# Patient Record
Sex: FEMALE | Race: WHITE | Employment: FULL TIME | URBAN - METROPOLITAN AREA
[De-identification: names, ages, dates, MRNs, and addresses within clinical notes are randomized per-mention and may not be internally consistent; named-entity substitution may affect disease eponyms.]

---

## 2024-06-29 ENCOUNTER — HOSPITAL ENCOUNTER (INPATIENT)
Facility: HOSPITAL | Age: 29
LOS: 1 days | Discharge: HOME | End: 2024-06-30
Attending: EMERGENCY MEDICINE | Admitting: HOSPITALIST
Payer: COMMERCIAL

## 2024-06-29 ENCOUNTER — APPOINTMENT (OUTPATIENT)
Dept: MRI IMAGING | Facility: HOSPITAL | Age: 29
End: 2024-06-29
Attending: EMERGENCY MEDICINE
Payer: COMMERCIAL

## 2024-06-29 ENCOUNTER — APPOINTMENT (OUTPATIENT)
Dept: CT IMAGING | Facility: HOSPITAL | Age: 29
End: 2024-06-29
Attending: EMERGENCY MEDICINE
Payer: COMMERCIAL

## 2024-06-29 DIAGNOSIS — R29.810 FACIAL WEAKNESS: ICD-10-CM

## 2024-06-29 DIAGNOSIS — R20.0 LEFT SIDED NUMBNESS: Primary | ICD-10-CM

## 2024-06-29 PROBLEM — S16.1XXA CERVICAL STRAIN: Status: ACTIVE | Noted: 2024-06-29

## 2024-06-29 LAB
ALBUMIN SERPL-MCNC: 3.8 G/DL (ref 3.4–5)
ALBUMIN/GLOB SERPL: 1 {RATIO} (ref 1–2)
ALP LIVER SERPL-CCNC: 73 U/L
ALT SERPL-CCNC: 28 U/L
ANION GAP SERPL CALC-SCNC: 7 MMOL/L (ref 0–18)
APTT PPP: 23.8 SECONDS (ref 23–36)
AST SERPL-CCNC: 13 U/L (ref 15–37)
B-HCG SERPL-ACNC: <1 MIU/ML
B-HCG UR QL: NEGATIVE
BASOPHILS # BLD AUTO: 0.02 X10(3) UL (ref 0–0.2)
BASOPHILS NFR BLD AUTO: 0.3 %
BILIRUB SERPL-MCNC: 0.6 MG/DL (ref 0.1–2)
BUN BLD-MCNC: 9 MG/DL (ref 9–23)
CALCIUM BLD-MCNC: 8.9 MG/DL (ref 8.5–10.1)
CHLORIDE SERPL-SCNC: 109 MMOL/L (ref 98–112)
CO2 SERPL-SCNC: 23 MMOL/L (ref 21–32)
CREAT BLD-MCNC: 1.05 MG/DL
EGFRCR SERPLBLD CKD-EPI 2021: 73 ML/MIN/1.73M2 (ref 60–?)
EOSINOPHIL # BLD AUTO: 0.16 X10(3) UL (ref 0–0.7)
EOSINOPHIL NFR BLD AUTO: 2.8 %
ERYTHROCYTE [DISTWIDTH] IN BLOOD BY AUTOMATED COUNT: 11.6 %
ETHANOL SERPL-MCNC: 105 MG/DL (ref ?–3)
GLOBULIN PLAS-MCNC: 4 G/DL (ref 2.8–4.4)
GLUCOSE BLD-MCNC: 85 MG/DL (ref 70–99)
GLUCOSE BLD-MCNC: 88 MG/DL (ref 70–99)
HCT VFR BLD AUTO: 40.1 %
HGB BLD-MCNC: 14.4 G/DL
IMM GRANULOCYTES # BLD AUTO: 0.01 X10(3) UL (ref 0–1)
IMM GRANULOCYTES NFR BLD: 0.2 %
INR BLD: 0.98 (ref 0.8–1.2)
LYMPHOCYTES # BLD AUTO: 2.26 X10(3) UL (ref 1–4)
LYMPHOCYTES NFR BLD AUTO: 39 %
MCH RBC QN AUTO: 31.4 PG (ref 26–34)
MCHC RBC AUTO-ENTMCNC: 35.9 G/DL (ref 31–37)
MCV RBC AUTO: 87.4 FL
MONOCYTES # BLD AUTO: 0.42 X10(3) UL (ref 0.1–1)
MONOCYTES NFR BLD AUTO: 7.3 %
NEUTROPHILS # BLD AUTO: 2.92 X10 (3) UL (ref 1.5–7.7)
NEUTROPHILS # BLD AUTO: 2.92 X10(3) UL (ref 1.5–7.7)
NEUTROPHILS NFR BLD AUTO: 50.4 %
OSMOLALITY SERPL CALC.SUM OF ELEC: 286 MOSM/KG (ref 275–295)
PLATELET # BLD AUTO: 216 10(3)UL (ref 150–450)
POTASSIUM SERPL-SCNC: 3.2 MMOL/L (ref 3.5–5.1)
PROT SERPL-MCNC: 7.8 G/DL (ref 6.4–8.2)
PROTHROMBIN TIME: 12.9 SECONDS (ref 11.6–14.8)
RBC # BLD AUTO: 4.59 X10(6)UL
SODIUM SERPL-SCNC: 139 MMOL/L (ref 136–145)
TROPONIN I SERPL HS-MCNC: 4 NG/L
WBC # BLD AUTO: 5.8 X10(3) UL (ref 4–11)

## 2024-06-29 PROCEDURE — 70496 CT ANGIOGRAPHY HEAD: CPT | Performed by: EMERGENCY MEDICINE

## 2024-06-29 PROCEDURE — 99223 1ST HOSP IP/OBS HIGH 75: CPT | Performed by: HOSPITALIST

## 2024-06-29 PROCEDURE — 70551 MRI BRAIN STEM W/O DYE: CPT | Performed by: EMERGENCY MEDICINE

## 2024-06-29 PROCEDURE — 70544 MR ANGIOGRAPHY HEAD W/O DYE: CPT | Performed by: EMERGENCY MEDICINE

## 2024-06-29 PROCEDURE — 70498 CT ANGIOGRAPHY NECK: CPT | Performed by: EMERGENCY MEDICINE

## 2024-06-29 PROCEDURE — 70450 CT HEAD/BRAIN W/O DYE: CPT | Performed by: EMERGENCY MEDICINE

## 2024-06-29 RX ORDER — ETONOGESTREL AND ETHINYL ESTRADIOL VAGINAL RING .015; .12 MG/D; MG/D
1 RING VAGINAL
COMMUNITY

## 2024-06-29 RX ORDER — POTASSIUM CHLORIDE 20 MEQ/1
TABLET, EXTENDED RELEASE ORAL
Status: COMPLETED
Start: 2024-06-29 | End: 2024-06-29

## 2024-06-29 RX ORDER — MELATONIN
3 NIGHTLY PRN
Status: DISCONTINUED | OUTPATIENT
Start: 2024-06-29 | End: 2024-06-30

## 2024-06-29 RX ORDER — CLOPIDOGREL BISULFATE 75 MG/1
75 TABLET ORAL ONCE
Status: COMPLETED | OUTPATIENT
Start: 2024-06-29 | End: 2024-06-29

## 2024-06-29 RX ORDER — HYDRALAZINE HYDROCHLORIDE 20 MG/ML
10 INJECTION INTRAMUSCULAR; INTRAVENOUS EVERY 2 HOUR PRN
Status: DISCONTINUED | OUTPATIENT
Start: 2024-06-29 | End: 2024-06-30

## 2024-06-29 RX ORDER — IBUPROFEN 200 MG
200 TABLET ORAL EVERY 4 HOURS PRN
Status: DISCONTINUED | OUTPATIENT
Start: 2024-06-29 | End: 2024-06-30

## 2024-06-29 RX ORDER — SODIUM CHLORIDE 9 MG/ML
INJECTION, SOLUTION INTRAVENOUS CONTINUOUS
Status: ACTIVE | OUTPATIENT
Start: 2024-06-29 | End: 2024-06-30

## 2024-06-29 RX ORDER — IBUPROFEN 600 MG/1
600 TABLET ORAL EVERY 4 HOURS PRN
Status: DISCONTINUED | OUTPATIENT
Start: 2024-06-29 | End: 2024-06-30

## 2024-06-29 RX ORDER — SODIUM CHLORIDE 9 MG/ML
1000 INJECTION, SOLUTION INTRAVENOUS ONCE
Status: COMPLETED | OUTPATIENT
Start: 2024-06-29 | End: 2024-06-29

## 2024-06-29 RX ORDER — LABETALOL HYDROCHLORIDE 5 MG/ML
10 INJECTION, SOLUTION INTRAVENOUS EVERY 10 MIN PRN
Status: DISCONTINUED | OUTPATIENT
Start: 2024-06-29 | End: 2024-06-30

## 2024-06-29 RX ORDER — ASPIRIN 81 MG/1
324 TABLET, CHEWABLE ORAL ONCE
Status: COMPLETED | OUTPATIENT
Start: 2024-06-29 | End: 2024-06-29

## 2024-06-29 RX ORDER — SODIUM CHLORIDE 9 MG/ML
INJECTION, SOLUTION INTRAVENOUS CONTINUOUS
Status: DISCONTINUED | OUTPATIENT
Start: 2024-06-29 | End: 2024-06-30

## 2024-06-29 RX ORDER — ACETAMINOPHEN 650 MG/1
650 SUPPOSITORY RECTAL EVERY 4 HOURS PRN
Status: DISCONTINUED | OUTPATIENT
Start: 2024-06-29 | End: 2024-06-30

## 2024-06-29 RX ORDER — ATORVASTATIN CALCIUM 40 MG/1
40 TABLET, FILM COATED ORAL NIGHTLY
Status: DISCONTINUED | OUTPATIENT
Start: 2024-06-29 | End: 2024-06-30

## 2024-06-29 RX ORDER — ACETAMINOPHEN 325 MG/1
650 TABLET ORAL EVERY 4 HOURS PRN
Status: DISCONTINUED | OUTPATIENT
Start: 2024-06-29 | End: 2024-06-30

## 2024-06-29 RX ORDER — POTASSIUM CHLORIDE 20 MEQ/1
40 TABLET, EXTENDED RELEASE ORAL ONCE
Status: COMPLETED | OUTPATIENT
Start: 2024-06-29 | End: 2024-06-29

## 2024-06-29 RX ORDER — IBUPROFEN 400 MG/1
400 TABLET ORAL EVERY 4 HOURS PRN
Status: DISCONTINUED | OUTPATIENT
Start: 2024-06-29 | End: 2024-06-30

## 2024-06-29 RX ORDER — BENZONATATE 100 MG/1
200 CAPSULE ORAL 3 TIMES DAILY PRN
Status: DISCONTINUED | OUTPATIENT
Start: 2024-06-29 | End: 2024-06-30

## 2024-06-29 RX ORDER — ACETAMINOPHEN 500 MG
1000 TABLET ORAL EVERY 6 HOURS PRN
Status: DISCONTINUED | OUTPATIENT
Start: 2024-06-29 | End: 2024-06-30

## 2024-06-29 RX ORDER — ASPIRIN 81 MG/1
81 TABLET, CHEWABLE ORAL DAILY
Status: DISCONTINUED | OUTPATIENT
Start: 2024-06-30 | End: 2024-06-30

## 2024-06-29 RX ORDER — ONDANSETRON 2 MG/ML
4 INJECTION INTRAMUSCULAR; INTRAVENOUS EVERY 6 HOURS PRN
Status: DISCONTINUED | OUTPATIENT
Start: 2024-06-29 | End: 2024-06-30

## 2024-06-29 NOTE — PROGRESS NOTES
ER DOC ZEITLER AT BEDSIDE    PT TO ER W/ COMPLAINTS OF LEFT SIDED FACIAL DROOP AND HEAVINESS TO LEFT ARM. SYMPTOMS BEGAN 1645 TODAY    AMBULATORY

## 2024-06-29 NOTE — ED PROVIDER NOTES
Patient Seen in: [facility] Emergency Department      History     Chief Complaint   Patient presents with    Numbness Weakness     Stated Complaint: NUMBNESS    Subjective:   HPI    Patient is a 29-year-old female presents emergency room with a history of being out today with friends at lunch did admit to drinking some alcohol earlier today and states that she coughed and had a sudden onset of some left-sided facial heaviness and also left arm and left leg heaviness.  This started within an hour of her presentation to the emergency room.  The patient denies history of any headache or neck pain.  The patient denies history of any fall or trauma.  The patient denies history of any vision change.  Patient states that she did see a chiropractor earlier this week and did have manipulation done of her neck.  Patient reportedly was also recently had a amusement park and rode a bunch of roller coasters.  The patient does admit to vaping but does not use recreational drugs.  The patient states she only had a small amount of alcohol today.  The patient denies history of any fall or trauma.  Patient's symptoms have mildly improved since they started as per patient and patient's friend at the bedside.    Objective:   Past Medical History:    Ocular migraine              History reviewed. No pertinent surgical history.             Social History     Socioeconomic History    Marital status:    Tobacco Use    Smoking status: Some Days     Types: Cigarettes    Smokeless tobacco: Never   Vaping Use    Vaping status: Every Day   Substance and Sexual Activity    Alcohol use: Yes     Comment: SOCIALLY    Drug use: Never              Review of Systems    Positive for stated Chief Complaint: Numbness Weakness    Other systems are as noted in HPI.  Constitutional and vital signs reviewed.      All other systems reviewed and negative except as noted above.    Physical Exam     ED Triage Vitals   BP 06/29/24 1733 (!) 139/102    Pulse 06/29/24 1733 70   Resp 06/29/24 1733 20   Temp 06/29/24 1757 97.6 °F (36.4 °C)   Temp src 06/29/24 1757 Temporal   SpO2 06/29/24 1733 98 %   O2 Device --        Current Vitals:   Vital Signs  BP: (!) 141/109  Pulse: 70  Resp: 22  Temp: 97.6 °F (36.4 °C)  Temp src: Temporal  MAP (mmHg): (!) 121    Oxygen Therapy  SpO2: 99 %            Physical Exam  GENERAL: Well-developed, well-nourished female sitting up breathing easily in no apparent distress.  Patient is nontoxic in appearance.  HEENT: Head is normocephalic, atraumatic. Pupils are 4 mm equally round and reactive to light. Oropharynx is clear. Mucous membranes are moist.  There is evidence of some mild left-sided facial drooping.  NECK: There is no focal tenderness to palpation appreciated.  LUNGS: Clear to auscultation bilaterally with no wheeze. There is good equal air entry bilaterally.  HEART: Regular rate and rhythm. Normal S1, S2 no S3, or S4. No murmur.  ABDOMEN: There is no focal tenderness to palpation appreciated anywhere throughout the abdomen. There is no guarding, no rebound, no mass, and no organomegaly appreciated. There is normoactive bowel sounds. There is no hernia.  EXTREMITIES: There is no cyanosis, clubbing, or edema appreciated. Pulses are 2+ and equal in all 4 extremities.  NEURO: Patient is awake, alert and oriented to time place and person. Motor strength is 5 over 5 in all 4 extremities. There are no gross motor or sensory deficits appreciated except as noted below. Cranial nerves II through XII are intact except for some mild left-sided facial numbness.  There is subjective numbness to the left upper and left lower extremity..             ED Course     Labs Reviewed   COMP METABOLIC PANEL (14) - Abnormal; Notable for the following components:       Result Value    Potassium 3.2 (*)     Creatinine 1.05 (*)     AST 13 (*)     All other components within normal limits   ETHYL ALCOHOL - Abnormal; Notable for the following  components:    Ethyl Alcohol 105 (*)     All other components within normal limits   TROPONIN I HIGH SENSITIVITY - Normal   PROTHROMBIN TIME (PT) - Normal   PTT, ACTIVATED - Normal   HCG, BETA SUBUNIT (QUANT PREGNANCY TEST) - Normal   POCT GLUCOSE - Normal   POCT PREGNANCY URINE - Normal   CBC WITH DIFFERENTIAL WITH PLATELET    Narrative:     The following orders were created for panel order CBC With Differential With Platelet.  Procedure                               Abnormality         Status                     ---------                               -----------         ------                     CBC W/ DIFFERENTIAL[[id number]]                              Final result                 Please view results for these tests on the individual orders.   HEMOGLOBIN A1C   RAINBOW DRAW LAVENDER   RAINBOW DRAW LIGHT GREEN   RAINBOW DRAW BLUE   RAINBOW DRAW GOLD   CBC W/ DIFFERENTIAL     EKG    Rate, intervals and axes as noted on EKG Report.  Rate: 88  Rhythm: Sinus Rhythm  Reading: No acute ischemic change noted           TNK/ NI Documentation:    Date/Time last known well:   1645.    NIHSS on presentation: 4     Chief Complaint   Patient presents with    Numbness Weakness     IV Tenecteplase (TNK) administered: No; Patient is not a Candidate for IV TNK due to: Presentation of symptoms and findings of possible AV fistula noted in the right side of the brain on CTA because of this patient was not a TNKase candidate as per discussion with Dr. Parr    Candidate for Endovascular thrombectomy (EVT): No; Patient is not a candidate for Endovascular Thrombectomy due to: No large vessel occlusion ( LVO)  on CTA/MRA imaging      Disposition: There is no disposition on file for this visit.         MRI STROKE BRAIN(NO IV) + MRA BRAIN(NO IV)(CPT=70544/30659)    Result Date: 6/29/2024  CONCLUSION:   1. Unremarkable MRI brain examination.  2. Unremarkable MR angiogram head examination.    LOCATION:  [facility]   Dictated by (CST):  Esau Long MD on 6/29/2024 at 8:25 PM     Finalized by (CST): Esau Long MD on 6/29/2024 at 8:28 PM       CT STROKE CTA BRAIN/CTA NECK (W IV)(CPT=70496/65407)    Result Date: 6/29/2024  CONCLUSION:   1. No evidence of a thrombus or significant stenosis in the major arterial structures in the head and neck.  2. 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  This critical result was discussed with Dr. Perez at 1814 hours on 6/29/2024. Read back was performed.     LOCATION:  [facility]   Dictated by (CST): Esau Long MD on 6/29/2024 at 6:03 PM     Finalized by (CST): Esau Long MD on 6/29/2024 at 6:14 PM       CT STROKE BRAIN (NO IV)(CPT=70450)    Result Date: 6/29/2024  PROCEDURE:  CT STROKE BRAIN (NO IV)(CPT=70450)  COMPARISON:  None.  INDICATIONS:  eval for stroke  TECHNIQUE:  Noncontrast CT scanning is performed through the brain.  Dose reduction techniques were used. Dose information is transmitted to the ACR (American College of Radiology) NRDR (National Radiology Data Registry) which includes the Dose Index Registry.  PATIENT STATED HISTORY: (As transcribed by Technologist)  Patient with facial droop. Code stroke.    FINDINGS: No evidence of hemorrhage or extra-axial fluid collection.  Supra and infratentorial brain parenchyma are unremarkable.  Ventricles and sulci are appropriate for the patient's age.  No mass effect.  Visualized portions of paranasal sinuses are unremarkable.  Visualized portions of mastoid air cells are unremarkable.  Visualized portions of orbits are unremarkable.  IMPRESSION: Unremarkable CT head.  Critical results were called to Dr. Perez at 1757 hours hours on 6/29/2024.  There was appropriate read back.    LOCATION:  [facility]   Dictated by (CST): Etahn  MD Esau on 6/29/2024 at 5:55 PM     Finalized by (CST): Esau oLng MD on 6/29/2024 at 5:57 PM               [facility]          18:27 patient sitting back and breathing easily in no apparent distress this time.  Patient still feels heaviness in the left side of her face and left arm and left leg at this time.  Patient's case has been discussed with Dr. Parr who did not feel the need for TNK at this time secondary to patient's CTA results.  The patient's case has been discussed with Dr. Alvarado who would like a stat MRI/MRA to be done at this time.  Stroke navigator, Pam is at the bedside at this time.  Awaiting MRI/MRA to be done at this time.  Admission disposition: 6/29/2024  8:36 PM         Patient an IV line established blood were drawn including a CBC, chemistries, BUN and creatinine, and blood sugar showed evidence of potassium 3.2 which is low for which the patient was given oral potassium in the ER.  Liver function test troponin found to be negative.  Patient alcohol of 105.  Accu-Chek now to be 88.  The patient is not pregnant.  The patient's coags are unremarkable.  A code stroke was called from the emergency room.  The patient had initial NIH score for here in the ER she went for CT and CTA initially and the patient's case was discussed with Dr. Parr who did not feel the need for TNK at this time secondary to patient's history of findings on CTA as noted above.  He did want the patient to be admitted to the general neurofloor with aspirin, Plavix, and permissive hypertension.  The patient's case was discussed with Dr. Alvarado from neurointerventional who wanted the patient to go for an emergent MRI/MRA which was done here from the ER both of which were found to be unremarkable.  The patient was given aspirin and Plavix as per Dr. Parr's recommendation and the patient's case was discussed with Dr. Wilson who came and saw the patient in the emergency room as well as Dr. Lemno and the patient  will be admitted to the floor to floor for further care at this time.  The patient was admitted for further care at this time..  Patient had no findings of any progression of symptoms while here in the ER.  Patient had good motor strength in all 4 extremities on initial exam and repeat examination here in the ER.  No evidence of any obvious facial droop on repeat examination.                               Medical Decision Making      Disposition and Plan     Clinical Impression:  1. Left sided numbness    2. Facial weakness         Disposition:  Admit  6/29/2024  8:36 pm    Follow-up:  No follow-up provider specified.        Medications Prescribed:  There are no discharge medications for this patient.                        Hospital Problems       Present on Admission             ICD-10-CM Noted POA    * (Principal) Left sided numbness R20.0 6/29/2024 Unknown    Cervical strain S16.1XXA 6/29/2024 Unknown    Facial weakness R29.810 6/29/2024 Unknown

## 2024-06-29 NOTE — PROGRESS NOTES
PT STATES SHE WAS ADJUSTED LAST AT [facility] LAST WEEK FOR COMPLAINTS OF RT SIDED NECK PAIN    WENT TO [facility] [employer] [facility] LAST WEEK.

## 2024-06-30 ENCOUNTER — APPOINTMENT (OUTPATIENT)
Dept: CV DIAGNOSTICS | Facility: HOSPITAL | Age: 29
End: 2024-06-30
Attending: HOSPITALIST
Payer: COMMERCIAL

## 2024-06-30 VITALS
OXYGEN SATURATION: 96 % | DIASTOLIC BLOOD PRESSURE: 82 MMHG | SYSTOLIC BLOOD PRESSURE: 130 MMHG | RESPIRATION RATE: 21 BRPM | HEART RATE: 74 BPM | WEIGHT: 148.81 LBS | TEMPERATURE: 98 F

## 2024-06-30 LAB
ATRIAL RATE: 88 BPM
CHOLEST SERPL-MCNC: 147 MG/DL (ref ?–200)
ERYTHROCYTE [DISTWIDTH] IN BLOOD BY AUTOMATED COUNT: 11.8 %
EST. AVERAGE GLUCOSE BLD GHB EST-MCNC: 85 MG/DL (ref 68–126)
GLUCOSE BLD-MCNC: 104 MG/DL (ref 70–99)
GLUCOSE BLD-MCNC: 92 MG/DL (ref 70–99)
HBA1C MFR BLD: 4.6 % (ref ?–5.7)
HCT VFR BLD AUTO: 33.7 %
HDLC SERPL-MCNC: 55 MG/DL (ref 40–59)
HGB BLD-MCNC: 12.1 G/DL
LDLC SERPL CALC-MCNC: 69 MG/DL (ref ?–100)
MCH RBC QN AUTO: 32 PG (ref 26–34)
MCHC RBC AUTO-ENTMCNC: 35.9 G/DL (ref 31–37)
MCV RBC AUTO: 89.2 FL
NONHDLC SERPL-MCNC: 92 MG/DL (ref ?–130)
P AXIS: 62 DEGREES
P-R INTERVAL: 138 MS
PLATELET # BLD AUTO: 188 10(3)UL (ref 150–450)
Q-T INTERVAL: 378 MS
QRS DURATION: 88 MS
QTC CALCULATION (BEZET): 457 MS
R AXIS: 53 DEGREES
RBC # BLD AUTO: 3.78 X10(6)UL
T AXIS: 34 DEGREES
TRIGL SERPL-MCNC: 132 MG/DL (ref 30–149)
VENTRICULAR RATE: 88 BPM
VLDLC SERPL CALC-MCNC: 20 MG/DL (ref 0–30)
WBC # BLD AUTO: 5 X10(3) UL (ref 4–11)

## 2024-06-30 PROCEDURE — 93306 TTE W/DOPPLER COMPLETE: CPT | Performed by: HOSPITALIST

## 2024-06-30 PROCEDURE — 99239 HOSP IP/OBS DSCHRG MGMT >30: CPT | Performed by: STUDENT IN AN ORGANIZED HEALTH CARE EDUCATION/TRAINING PROGRAM

## 2024-06-30 RX ORDER — ATORVASTATIN CALCIUM 20 MG/1
20 TABLET, FILM COATED ORAL NIGHTLY
Qty: 30 TABLET | Refills: 0 | Status: SHIPPED | OUTPATIENT
Start: 2024-06-30

## 2024-06-30 RX ORDER — ASPIRIN 81 MG/1
81 TABLET, CHEWABLE ORAL DAILY
Qty: 30 TABLET | Refills: 0 | Status: SHIPPED | OUTPATIENT
Start: 2024-07-01

## 2024-06-30 RX ORDER — ATORVASTATIN CALCIUM 20 MG/1
20 TABLET, FILM COATED ORAL NIGHTLY
Status: DISCONTINUED | OUTPATIENT
Start: 2024-06-30 | End: 2024-06-30

## 2024-06-30 NOTE — SLP NOTE
ADULT SWALLOWING EVALUATION    ASSESSMENT    PERTINENT BACKGROUND INFORMATION:  Patient is a 29 year-old female who was admitted on 6/29/24 with Facial weakness [R29.810], Left sided numbness [R20.0].  Imaging results reviewed and noted below, which were unremarkable.  Currently on RA with adequate SPO2 saturations. Hospital course significant for patient reporting her symptoms have resolved.  She no longer has weakness on her left side or numbness on her left face.  PMH is significant for anxiety state and ocular migraines which may impact care.  Prior to this hospitalization, patient was consuming a reg diet with thin liquids.  Patient failed RN swallow screening due to presence of facial droop at that time. Patient is currently on a reg diet with thin liquids.    ASSESSMENT:   Patient was awake and alert.  Able to maintain midline position with adequate trunk and head control when upright in bed. Oral motor mechanism exam was unremarkable; labial and lingual musculature were symmetrical. Patient presented with WNL oropharyngeal skills with absent signs/symptoms of aspiration during the controlled conditions of this exam and when swallow mechanism was challenged via consecutive swallow of thin liquid by cup and straw.  Please see below objective section for details. No further inpatient ST required or recommended.    Pedro Assessment of Swallow Function Score: No abnormality detected      EDUCATION:  - Patient/family verbalized understanding to results and recommendations of this evaluation and was in agreement.   - Spoke with LUIS ANGEL Fernandez.        RECOMMENDATIONS/PLAN OF CARE   -  Recommend continue current diet with implementation of general swallow support strategies outlined below.     Diet Recommendations - Solids: Regular  Diet Recommendations - Liquids: Thin Liquids  Aspiration Precautions: Upright position;Slow rate;Small bites and sips  Medication Administration Recommendations: No restrictions  Treatment  Plan/Recommendations: No further inpatient SLP service warranted    HISTORY   MEDICAL HISTORY  Reason for Referral: Stroke protocol    Problem List  Principal Problem:    Left sided numbness  Active Problems:    Cervical strain    Facial weakness      Past Medical History  Past Medical History:    Anxiety state    Ocular migraine          Diet Prior to Admission: Regular;Thin liquids  Precautions: None    Patient/Family Goals: not stated    SWALLOWING HISTORY  Current Diet Consistency: Regular;Thin liquids  Dysphagia History: none      IMAGING  MRI STROKE BRAIN(NO IV) + MRA BRAIN(NO IV)CONCLUSION:       1. Unremarkable MRI brain examination.      2. Unremarkable MR angiogram head examination.       LOCATION:  [facility]      Dictated by (CST): Esau Long MD on 6/29/2024 at 8:25 PM       Finalized by (CST): Esau Long MD on 6/29/2024 at 8:28 PM    SUBJECTIVE   Patient cooperative.     OBJECTIVE   ORAL MOTOR EXAMINATION  Dentition: Natural  Symmetry: Within Functional Limits  Strength: Within Functional Limits     Range of Motion: Within Functional Limits  Rate of Motion: Within Functional Limits    Voice Quality: Clear  Respiratory Status: Unlabored  Consistencies Trialed: Thin liquids;Hard solid;Puree  Method of Presentation: Self presentation  Patient Positioning: Upright;Midline    Oral Phase of Swallow: Within Functional Limits                      Pharyngeal Phase of Swallow: Within Functional Limits           (Please note: Silent aspiration cannot be evaluated clinically. Videofluoroscopic Swallow Study is required to rule-out silent aspiration.)    Esophageal Phase of Swallow: No complaints consistent with possible esophageal involvement  Comments:     FOLLOW UP  Treatment Plan/Recommendations: No further inpatient SLP service warranted     Follow Up Needed (Documentation Required): No       Thank you for your referral.   If you have any questions, please contact STANLEY Carrasco

## 2024-06-30 NOTE — ED QUICK NOTES
Orders for admission, patient is aware of plan and ready to go upstairs. Any questions, please call ED RN Michelle at extension [phone].     Patient Covid vaccination status: Unvaccinated     COVID Test Ordered in ED: None    COVID Suspicion at Admission: N/A    Running Infusions:      Mental Status/LOC at time of transport: a/ox4    Other pertinent information:   CIWA score: N/A   NIH score:  4

## 2024-06-30 NOTE — OCCUPATIONAL THERAPY NOTE
OCCUPATIONAL THERAPY EVALUATION - INPATIENT    Room Number: 7613/7613-A  Evaluation Date: 6/30/2024     Type of Evaluation: Initial  Presenting Problem: LUE numbness    Physician Order: IP Consult to Occupational Therapy  Reason for Therapy:  ADL/IADL Dysfunction and Discharge Planning      OCCUPATIONAL THERAPY ASSESSMENT   Patient is a 29 year old female admitted on 6/29/2024 with Presenting Problem: LUE numbness. Co-Morbidities : none  Patient met all OT goals at independent level.  Patient reports no further questions/concerns at this time. Recommend discharge from skilled OT in the acute hospital setting.       WEIGHT BEARING RESTRICTION  Weight Bearing Restriction: None                Recommendations for nursing staff:   Transfers: independent  Toileting location: Toilet    EVALUATION SESSION:  Patient at start of session: semi supine, spouse present  FUNCTIONAL TRANSFER ASSESSMENT  Sit to Stand: Edge of Bed  Edge of Bed: Independent    BED MOBILITY  Supine to Sit : Independent  Sit to Supine (OT): Independent    BALANCE ASSESSMENT     FUNCTIONAL ADL ASSESSMENT  Grooming Seated: Independent  LB Dressing Standing: Independent      ACTIVITY TOLERANCE   Vitals wfl    COGNITION  Overall Cognitive Status:  WFL - within functional limits    Upper Extremity:   ROM: within functional limits   Strength: is within functional limits   Coordination:  Gross motor: wfl  Fine motor: wfl  Sensation: Light touch:  intact    EDUCATION PROVIDED  Patient: Role of Occupational Therapy; Plan of Care  Patient's Response to Education: Verbalized Understanding    Equipment used: non  Demonstrates functional use    Therapist comments: ADL/mobility as noted. Vision/informal cognitive screening with no deficits. LUE symptoms have resolved.     Patient End of Session: In bed;Needs met;Call light within reach;RN aware of session/findings;All patient questions and concerns addressed;Family present    OCCUPATIONAL PROFILE    HOME  SITUATION  Type of Home: House  Home Layout: Two level  Lives With: Spouse    Toilet and Equipment: Standard height toilet  Shower/Tub and Equipment: Tub-shower combo       Occupation/Status: energy design, desk and field work  Hand Dominance: Right  Drives: Yes       Prior Level of Function: Pt reports independence with ADL/IADL and ambulation without AD prior to admit.      SUBJECTIVE  Pt was in town for a candle making class for her sister's birthday    PAIN ASSESSMENT  Ratin          OBJECTIVE  Precautions: None  Fall Risk: Standard fall risk    WEIGHT BEARING RESTRICTION  Weight Bearing Restriction: None                AM-PAC ‘6-Clicks’ Inpatient Daily Activity Short Form  -   Putting on and taking off regular lower body clothing?: None  -   Bathing (including washing, rinsing, drying)?: None  -   Toileting, which includes using toilet, bedpan or urinal? : None  -   Putting on and taking off regular upper body clothing?: None  -   Taking care of personal grooming such as brushing teeth?: None  -   Eating meals?: None    AM-PAC Score:  Score: 24  Approx Degree of Impairment: 0%  Standardized Score (AM-PAC Scale): 57.54      ADDITIONAL TESTS     NEUROLOGICAL FINDINGS  Coordination - Rapid Alternating Movement: Symmetrical  Coordination - Finger Opposition: Symmetrical       PLAN   Patient has been evaluated and presents with no skilled Occupational Therapy needs at this time.  Patient discharged from Occupational Therapy services.  Please re-order if a new functional limitation presents during this admission.      Patient Evaluation Complexity Level:   Occupational Profile/Medical History LOW   Specific performance deficits impacting engagement in ADL/IADL LOW   Client Assessment/Performance Deficits LOW   Clinical Decision Making LOW   Overall Complexity LOW     OT Session Time: 15 minutes  Self-Care Home Management:  minutes  Therapeutic Activity:  minutes  Neuromuscular Re-education:  minutes  Therapeutic  Exercise:  minutes

## 2024-06-30 NOTE — CM/SW NOTE
MDO Social Work Consult for home health eval; PT/OT/ST evaluations completed, patient at baseline functional status (independent).  CM spoke to patient via phone for discharge needs follow up, patient stated she has no needs at this time.     Regina Orr RN Case Manager [phone]

## 2024-06-30 NOTE — PHYSICAL THERAPY NOTE
PHYSICAL THERAPY EVALUATION - INPATIENT     Room Number: 7613/7613-A  Evaluation Date: 2024  Type of Evaluation: Initial  Physician Order: PT Eval and Treat    Presenting Problem: sudden onset L sided heaviness, sensation changes  Co-Morbidities : none  Reason for Therapy: Mobility Dysfunction and Discharge Planning    PHYSICAL THERAPY ASSESSMENT   Patient is a 29 year old female admitted 2024 for sudden onset L sided heaviness, sensation changes.   Patient is currently functioning at baseline with bed mobility, transfers, and gait. Prior to admission, patient's baseline is independent.     Given the patient is functioning near baseline level do not anticipate skilled therapy needs at discharge .    PLAN  Patient has been evaluated and presents with no skilled Physical Therapy needs at this time.  Patient discharged from Physical Therapy services.  Please re-order if a new functional limitation presents during this admission.    GOALS  Patient was able to achieve the following goals ...    Patient was able to transfer At previous, functional level   Patient able to ambulate on level surfaces At previous, functional level         HOME SITUATION  Type of Home: House   Home Layout: Two level        Stairs to Bedroom: 0       Lives With: Spouse  Drives: Yes  Patient Owned Equipment: None       Prior Level of [place]: Pt typically indep with ADLs and mobility. From [place]. Typically active.     SUBJECTIVE  \"I feel a lot better\"      OBJECTIVE  Precautions: None  Fall Risk: Standard fall risk    WEIGHT BEARING RESTRICTION  Weight Bearing Restriction: None                PAIN ASSESSMENT  Ratin          COGNITION  Overall Cognitive Status:  WFL - within functional limits    RANGE OF MOTION AND STRENGTH ASSESSMENT  See OT note for UE assessment    Lower extremity ROM is within functional limits     Lower extremity strength is within functional limits       BALANCE  Static Sitting: Normal  Dynamic  Sitting: Normal  Static Standing: Good  Dynamic Standing: Good    ADDITIONAL TESTS  Additional Tests: Modified [place]              Modified [place]: 0                  ACTIVITY TOLERANCE                         O2 WALK       NEUROLOGICAL FINDINGS  Neurological Findings: Coordination - Heel to Shin;Coordination - Rapid Alternating Movement;Sensation     Coordination - Heel to Shin: Symmetrical  Coordination - Rapid Alternating Movement: Symmetrical  Sensation: intact to light touch BLE         AM-PAC '6-Clicks' INPATIENT SHORT FORM - BASIC MOBILITY  How much difficulty does the patient currently have...  Patient Difficulty: Turning over in bed (including adjusting bedclothes, sheets and blankets)?: None   Patient Difficulty: Sitting down on and standing up from a chair with arms (e.g., wheelchair, bedside commode, etc.): None   Patient Difficulty: Moving from lying on back to sitting on the side of the bed?: None   How much help from another person does the patient currently need...   Help from Another: Moving to and from a bed to a chair (including a wheelchair)?: None   Help from Another: Need to walk in hospital room?: None   Help from Another: Climbing 3-5 steps with a railing?: None       AM-PAC Score:  Raw Score: 24   Approx Degree of Impairment: 0%   Standardized Score (AM-PAC Scale): 61.14   CMS Modifier (G-Code): CH    FUNCTIONAL ABILITY STATUS  Gait Assessment   Functional Mobility/Gait Assessment  Gait Assistance: Independent  Distance (ft): 250  Assistive Device: None  Pattern: Within Functional Limits    Skilled Therapy Provided     Bed Mobility:  Rolling: NT  Supine to sit: ind   Sit to supine: ind     Transfer Mobility:  Sit to stand: ind   Stand to sit: ind  Gait = ind    Therapist's comments: RN cleared for session. Pt agreeable for therapy, received supine. Spouse present for session. Pt overall indep throughout session, reports no concerns regarding functional mobility at this time.      Exercise/Education Provided:  Bed mobility  Body mechanics  Energy conservation  Functional activity tolerated  Gait training  Posture  Strengthening  Transfer training    Patient End of Session: In bed;Needs met;Call light within reach;RN aware of session/findings;All patient questions and concerns addressed;Family present    Patient Evaluation Complexity Level:  History High - 3 or more personal factors and/or co-morbidities   Examination of body systems Low - addressing 1-2 elements   Clinical Presentation Low - Stable   Clinical Decision Making Low Complexity       PT Session Time: 15 minutes  Gait Training: 3 minutes

## 2024-06-30 NOTE — H&P
[facility]     Aniyah Hurley Patient Status:  Emergency    1995 MRN [id number]   Location [facility] EMERGENCY DEPARTMENT Attending Crescencio Perez MD   Hosp Day # 0 PCP None Pcp     Chief Complaint: L sided weakness     Subjective:    History of Present Illness:   Aniyah Hurley is a 29 year old female with sudden onset of left-sided heaviness and sensation changes.  Patient states that occurred at a restaurant when she was in the bathroom.  No loss of taste or dysarthria.  No focal weakness per se but just feels slightly different than the right.  The symptoms are also on the left side of her face but certainly worse in the arm and the leg.  She was able to ambulate without issue.  She had  a Recent chiropractor visit with neck adjustment x2.  She also visited [place] and went on roller coaster rides last weekend with no sudden pain.  She states the right side of her neck is still slightly tender without full range of motion but has been improving.  She has a history of ocular migraines but has had no issues with this diagnosis for some time.  No vision changes or headaches.      History/Other:    Past Medical History:  Past Medical History:    Ocular migraine     Past Surgical History:   History reviewed. No pertinent surgical history.   Family History:   No family history on file.  Social History:    reports that she has been smoking cigarettes. She has never used smokeless tobacco. She reports current alcohol use. She reports that she does not use drugs.   Allergies: No Known Allergies  Medications:    No current facility-administered medications on file prior to encounter.     No current outpatient medications on file prior to encounter.     Review of Systems:   A comprehensive review of systems was completed.    Pertinent positives and negatives noted in the HPI.    Objective:   Physical Exam:    /87   Pulse 69   Temp 97.6 °F (36.4 °C) (Temporal)    Resp 22   Wt 148 lb 13 oz (67.5 kg)   SpO2 98%   General: No acute distress, Alert.    Respiratory: No rhonchi, no wheezes  Cardiovascular: S1, S2. Regular rate and rhythm  Abdomen: Soft, NT/ND, +BS  Neuro: NIH 4 on admission. Left side heaviness  Extremities: No edema    Results:    Labs:    Labs Last 24 Hours:  Recent Labs   Lab 06/29/24  1740   RBC 4.59   HGB 14.4   HCT 40.1   MCV 87.4   MCH 31.4   MCHC 35.9   RDW 11.6   NEPRELIM 2.92   WBC 5.8   .0     Recent Labs   Lab 06/29/24  1744   GLU 85   BUN 9   CREATSERUM 1.05*   EGFRCR 73   CA 8.9   ALB 3.8      K 3.2*      CO2 23.0   ALKPHO 73   AST 13*   ALT 28   BILT 0.6   TP 7.8     Lab Results   Component Value Date    INR 0.98 06/29/2024     Recent Labs   Lab 06/29/24  1744   TROPHS 4     No results for input(s): \"TROP\", \"PBNP\" in the last 168 hours.  No results for input(s): \"PCT\" in the last 168 hours.  Imaging: Imaging data reviewed in Epic.    Assessment & Plan:      #L sided heaviness NIH 4  -stat CTA ?AVF  -stat MRI/MRA   -Neuro consulted  -ASA, plavix     # Recent neck strain-analgesics as needed    Quality:  DVT Mechanical Prophylaxis:        DVT Pharmacologic Prophylaxis   Medication   None                Code Status: Not on file  Wray: No urinary catheter in place  Wray Duration (in days):   Central line:    MARISOL:     Plan of care discussed with patient, staff     Supplementary Documentation:   The 21st Century Cures Act makes medical notes like these available to patients in the interest of transparency. Please be advised this is a medical document. Medical documents are intended to carry relevant information, facts as evident, and the clinical opinion of the practitioner. The medical note is intended as peer to peer communication and may appear blunt or direct. It is written in medical language and may contain abbreviations or verbiage that are unfamiliar.

## 2024-06-30 NOTE — SIGNIFICANT EVENT
Stroke Alert initiated ED  Last Know Normal at 1645 with symptom onset  Pre-morbid MRS 0  Initial NIHSS 4 including left sided weakness, facial droop, decreased sensation.  Patient accompanied to CT dept  Repeat NIHSS completed back in ED room    NIH Stroke Scale post CT scans  1a.  Level of consciousness: 0   1b. LOC questions:  0   1c. LOC commands: 0   2.  Best Gaze: 0   3. Visual: 0   4. Facial Palsy: 1 (left facial droop)   5a. Motor left arm: 1 (mild drift, holds for 10 seconds, drifts down slightly, does not hit the bed. Patient reports arm feels \"heavy\" and is requiring noticeably more effort than the right arm to hold it up).   5b.  Motor right arm: 0   6a. Motor left le (mild drift, holds for 5 seconds, drifts down slightly, does not hit the bed. She reports the leg feels \"heavy\" and is requiring more effort than the right leg to hold it up).   6b.  Motor right le   7. Limb Ataxia: 0   8.  Sensory: 1 (feels pin prick is \"less sharp\" to her entire left side- face, arm and leg).   9. Best Language:  0   10. Dysarthria: 0   11. Extinction and Inattention: 0     Total:   4         Per Dr Parr, patient is not a candidate for TNK, exclusions include low score, improving symptoms (per patient and sister she's gotten much better since the episode started), and possible fistula on CTA, He recommends ASA, plavix, permissive hypertension, admission to stroke floor with gen neuro consult.    Dr. Long notified of consult by navigator.    Per Dr Gentile, patient is not a candidate for neuro intervention, exclusions include no LVO. After reviewing the CTA Dr. Gentile said he does not see a fistula but recommends the patient still get a stat MRI/MRA.     Dr. Parr updated and he still recommends no TNK at this time and admission to stroke floor with consult to gen neuro (patient's assessment is unchanged).    Handoff given to Ani (night navigator) since patient remains in the window for TNK until  2045.    Case discussed with Dr Perez, ED, endorsed with him all of the recommendations and gave him noc navigator's direct phone number if patient's assessment worsens [phone].    Patient educated on stroke work up, treatment, plan of care, and what to expect during hospitalization; all pertinent questions and concerns addressed    Of note: Patient reports she was at a painting event and did have some alcohol (one beer and a glass of wine). At 1645 she was out to eat when she coughed, and then immediately noticed that her left arm and leg felt heavy, numbness/tingling to her left side, and a left facial droop. ETOH level on arrival 105. She is talking coherently and providing a good history. Her sister is with her and is confirming all of the facts. She is not on any blood thinners, and reports that she is currently on birth control.  At baseline, she is a/ox4 and completely independent.    Stroke Alert timing affected by: N/A    Please refer to the Stroke Data Flowsheets for additional information and Stroke Alert response times.

## 2024-06-30 NOTE — PLAN OF CARE
Assumed care at 0730  A&Ox4, VSS, up adlib   No complaints of pain   No change in neuro status   Echo complete   PT/OT/ST eval complete  Medications, prescriptions and AVS reviewed with patient and family- verbalized understanding   All questions answered      NURSING DISCHARGE NOTE    Discharged Home via Wheelchair.  Accompanied by RN  Belongings Taken by patient/family.

## 2024-06-30 NOTE — DISCHARGE PLANNING
Patient follow-up appointment information:     Patient does not require a stroke/TIA follow-up.    RN Stroke Navigator team  [phone]

## 2024-06-30 NOTE — PROGRESS NOTES
[facility]   part of [facility]     Hospitalist Progress Note     Aniyah Hurley Patient Status:  Inpatient    1995 MRN [id number]   Location [facility] 7NE-A Attending Soila Lakhani, DO   Hosp Day # 1 PCP None Pcp     Chief Complaint: Left-sided weakness    Subjective:     Patient reports her symptoms have resolved.  She no longer has weakness on her left side or numbness on her left face    Objective:    Review of Systems:   A comprehensive review of systems was completed; pertinent positive and negatives stated in subjective.    Vital signs:  Temp:  [97.6 °F (36.4 °C)-98.8 °F (37.1 °C)] 98.3 °F (36.8 °C)  Pulse:  [64-96] 76  Resp:  [16-22] 18  BP: (102-146)/() 131/86  SpO2:  [93 %-100 %] 96 %    Physical Exam:    General: No acute distress  Respiratory: No wheezes, no rhonchi  Cardiovascular: S1, S2, regular rate and rhythm  Abdomen: Soft, Non-tender, non-distended, positive bowel sounds  Neuro: No new focal deficits.   Extremities: No edema    Diagnostic Data:    Labs:  Recent Labs   Lab 24  1740 24  0636   WBC 5.8 5.0   HGB 14.4 12.1   MCV 87.4 89.2   .0 188.0   INR 0.98  --        Recent Labs   Lab 24  1744   GLU 85   BUN 9   CREATSERUM 1.05*   CA 8.9   ALB 3.8      K 3.2*      CO2 23.0   ALKPHO 73   AST 13*   ALT 28   BILT 0.6   TP 7.8       CrCl cannot be calculated (Unknown ideal weight.).    Recent Labs   Lab 24  1744   TROPHS 4       Recent Labs   Lab 24  1740   PTP 12.9   INR 0.98                  Microbiology    No results found for this visit on 24.      Imaging: Reviewed in Epic.    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 Hurley is expected to be discharge to: Home    The 21st Century Cures Act makes medical notes like these available to patients in the interest of transparency. Please be advised this is a medical document. Medical documents are intended to carry relevant information, facts as evident, and the clinical opinion of the practitioner. The medical note is intended as peer to peer communication and may appear blunt or direct. It is written in medical language and may contain abbreviations or verbiage that are unfamiliar.

## 2024-06-30 NOTE — CONSULTS
[facility]   NEUROLOGY   CONSULT NOTE    Admission date: 6/29/2024  Reason for Consult: Left facial numbness/weakness  Chief Complaint:   Chief Complaint   Patient presents with    Numbness Weakness   ________________________________________________________________    History     History of Presenting Illness  29 year old female with history of migraines, started noticing left facial heaviness progressing to left arm and leg heaviness in the afternoon and presented to emergency department.  Patient was evaluated in emergency department and was not deemed candidate for TNK.  There was concern that patient may have right frontoparietal AV fistula.  Case was discussed with neurointervention team.  Stat MRI of the brain with MRA was performed which did not show any evidence of stroke and or fistula.  Patient was admitted.  Patient's symptoms have completely resolved now.  Patient states that after resolution of symptoms last night she did develop mild headache which spontaneously resolved.  Patient has never had a similar episode in the past.  Presently denies diplopia, dysarthria, dysphagia or new weakness in the upper or lower extremity.  Patient has history of oral contraceptive use and also vapes on regular basis.  Patient denies recreational drug use.  No history of miscarriages or DVTs.    History obtained from patient, her  and chart review.    Past Medical History:    Anxiety state    Ocular migraine     History reviewed. No pertinent surgical history.  Social History     Socioeconomic History    Marital status:    Tobacco Use    Smoking status: Some Days     Types: Cigarettes    Smokeless tobacco: Never   Vaping Use    Vaping status: Every Day    Substances: Nicotine   Substance and Sexual Activity    Alcohol use: Yes     Comment: socially    Drug use: Never     Social Determinants of Health     Food Insecurity: No Food Insecurity (6/29/2024)    Food Insecurity     Food  Insecurity: Never true   Transportation Needs: No Transportation Needs (6/29/2024)    Transportation Needs     Lack of Transportation: No   Housing Stability: Low Risk  (6/29/2024)    Housing Stability     Housing Instability: No     History reviewed. No pertinent family history.  Allergies No Known Allergies    Home Meds  Current Outpatient Medications   Medication Instructions    Etonogestrel-Ethinyl Estradiol (HALOETTE) 0.12-0.015 MG/24HR Vaginal Ring 1 Ring, Vaginal, Every 3 weeks     Scheduled Meds:   aspirin  81 mg Oral Daily    atorvastatin  40 mg Oral Nightly     Continuous Infusions:   sodium chloride 75 mL/hr at 06/29/24 2234     PRN Meds:  acetaminophen **OR** acetaminophen    labetalol    hydrALAzine    ondansetron    melatonin    benzonatate    guaiFENesin    acetaminophen    ibuprofen **OR** ibuprofen **OR** ibuprofen    OBJECTIVE   VITAL SIGNS:   Temp:  [97.6 °F (36.4 °C)-98.8 °F (37.1 °C)] 98 °F (36.7 °C)  Pulse:  [64-96] 74  Resp:  [16-22] 21  BP: (102-146)/() 130/82  SpO2:  [93 %-100 %] 96 %    PHYSICAL EXAM:    NEUROLOGIC:    Mental Status:  A&O x 4, Follows simple commands, no obvious aphasia or dysarthria  Cranial nerves: PERRL.  Visual fields full.  EOMI.  Face symmetric with normal movement bilaterally.  Hearing grossly intact. Tongue midline with normal movements.   Motor: Drift:  Absent; Motor exam is 5 out of 5 in all extremities bilaterally  Sensation: Intact to light touch bilaterally  Cerebellar: Normal Finger-To-Nose test      LABORATORY DATA:  Last 24 hour labs were reviewed in detail.  Recent Labs   Lab 06/29/24  1744      K 3.2*      CO2 23.0   GLU 85   BUN 9   CREATSERUM 1.05*     Recent Labs   Lab 06/29/24  1740 06/30/24  0636   WBC 5.8 5.0   HGB 14.4 12.1   .0 188.0     Recent Labs   Lab 06/29/24  1744   ALT 28   AST 13*     No results for input(s): \"MG\", \"PHOS\" in the last 168 hours.  Last A1c value was 4.6% done 6/29/2024.     Lab Results   Component  Value Date    LDL 69 06/29/2024    HDL 55 06/29/2024    TRIG 132 06/29/2024    VLDL 20 06/29/2024        Radiology:    MRI STROKE BRAIN(NO IV) + MRA BRAIN(NO IV)(CPT=70544/52522)    Result Date: 6/29/2024  CONCLUSION:   1. Unremarkable MRI brain examination.  2. Unremarkable MR angiogram head examination.    LOCATION:  [facility]   Dictated by (CST): Esau Long MD on 6/29/2024 at 8:25 PM     Finalized by (CST): Esau Long MD on 6/29/2024 at 8:28 PM       CT STROKE CTA BRAIN/CTA NECK (W IV)(CPT=70496/10541)    Result Date: 6/29/2024  CONCLUSION:   1. No evidence of a thrombus or significant stenosis in the major arterial structures in the head and neck.  2. 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  This critical result was discussed with Dr. Perez at 1814 hours on 6/29/2024. Read back was performed.     LOCATION:  [facility]   Dictated by (CST): Esau Long MD on 6/29/2024 at 6:03 PM     Finalized by (CST): Esau Long MD on 6/29/2024 at 6:14 PM       CT STROKE BRAIN (NO IV)(CPT=70450)    Result Date: 6/29/2024  PROCEDURE:  CT STROKE BRAIN (NO IV)(CPT=70450)  COMPARISON:  None.  INDICATIONS:  eval for stroke  TECHNIQUE:  Noncontrast CT scanning is performed through the brain.  Dose reduction techniques were used. Dose information is transmitted to the ACR (American College of Radiology) NRDR (National Radiology Data Registry) which includes the Dose Index Registry.  PATIENT STATED HISTORY: (As transcribed by Technologist)  Patient with facial droop. Code stroke.    FINDINGS: No evidence of hemorrhage or extra-axial fluid collection.  Supra and infratentorial brain parenchyma are unremarkable.  Ventricles and sulci are appropriate for the patient's age.  No mass effect.  Visualized portions of  paranasal sinuses are unremarkable.  Visualized portions of mastoid air cells are unremarkable.  Visualized portions of orbits are unremarkable.  IMPRESSION: Unremarkable CT head.  Critical results were called to Dr. Perez at 1757 hours hours on 6/29/2024.  There was appropriate read back.    LOCATION:  [facility]   Dictated by (CST): Esau Long MD on 6/29/2024 at 5:55 PM     Finalized by (CST): Esau Long MD on 6/29/2024 at 5:57 PM      ASSESSMENT/PLAN   29 year old female with:    Sudden onset left facial and left arm heaviness/numbness of unclear etiology.  Given patient's previous history of migraine and mild headache after resolution of symptoms, possibility of complex migraine exists.  However given some of the risk factors including history of oral contraceptive use, regular history of vaping I am requesting hypercoagulable workup.  Patient is advised to continue baby aspirin in the meantime.  Patient is to follow-up with neurologist in 3 weeks for further review of hypercoag workup and need to continue antiplatelet medication.  Patient and  was counseled.  All questions answered.  Mild hypokalemia.  Advise correction.  Patient counseled regarding adverse effects related to vaping.    Principal Problem:    Left sided numbness  Active Problems:    Cervical strain    Facial weakness       Abelardo Long MD  Neurohospitalist  [facility]    Disclaimer: This record was dictated using Dragon software. There may be errors due to voice recognition problems that were not realized and corrected during the completion of the note.

## 2024-06-30 NOTE — PROGRESS NOTES
NURSING ADMISSION NOTE      Patient admitted via Cart  Oriented to room.  Safety precautions initiated.  Bed in low position.  Call light in reach.    Pt here for L sided heaviness, very mild facial droop and L sided decreased sensation.   Admission navigator completed.     Pt is A&O x4.   VSS- NSR on tele.   RA.   Neuro checks q2hr- pt continues to have L sided \"heaviness\" and decreased sensation to face and LUE. L drift present in upper and lower extremity. Slight L facial droop present.   Despite facial droop- MD approved RN to conduct water challenge. Pt passed bedside swallow and okay for PO intake.    General diet with accuchecks q6hr.   IVF- 0.9NS @ 75mL/hr infusing to RAC.   Echo to be completed. PT/OT to eval.     Neuro following case.     0600- decreased sensation to LUE and L side of face resolved. L drift still present but improving.

## 2024-07-01 LAB
ANTITHROMBIN: 78 %
PROTEIN S FUNCTION: 77 %

## 2024-07-02 ENCOUNTER — PATIENT OUTREACH (OUTPATIENT)
Dept: CASE MANAGEMENT | Age: 29
End: 2024-07-02

## 2024-07-02 DIAGNOSIS — Z02.9 ENCOUNTERS FOR UNSPECIFIED ADMINISTRATIVE PURPOSE: Primary | ICD-10-CM

## 2024-07-02 DIAGNOSIS — R29.810 FACIAL WEAKNESS: ICD-10-CM

## 2024-07-02 LAB
ANTITHROMBIN AG: 66 %
APTT PPP: 25.5 SECONDS (ref 23–36)
B2 GLYCOPROT1 IGG SERPL IA-ACNC: 1.5 U/ML (ref ?–7)
B2 GLYCOPROT1 IGM SERPL IA-ACNC: 3.4 U/ML (ref ?–7)
CARDIOLIPIN IGG SERPL-ACNC: 1.3 GPL (ref ?–10)
CARDIOLIPIN IGM SERPL-ACNC: 10 MPL (ref ?–10)
FACTOR VIII ACT: 92 %
INR BLD: 1.05 (ref 0.85–1.16)
LA 3 SCREEN W REFLEX-IMP: NEGATIVE
PROTEIN C FUNCTIONAL: 90 %
PROTHROMBIN TIME: 13.7 SECONDS (ref 11.6–14.8)
SCREEN DRVVT: 1.01 S (ref 0–1.29)
SCREEN DRVVT: NEGATIVE S
STACLOT LA DELTA: 1.1 SECONDS (ref ?–8)

## 2024-07-03 LAB — PROTEIN C AG: 99 %

## 2024-07-04 NOTE — DISCHARGE SUMMARY
[facility]OSPITAL[facility]  DISCHARGE SUMMARY     Aniyah Hurley Patient Status:  Inpatient    1995 MRN [id number]   Location [facility] 7NE-A Attending No att. providers found   Hosp Day # 1 PCP None Pcp     Date of Admission: 2024  Date of Discharge:  2024     Discharge Disposition: Home or Self Care    Discharge Diagnosis:  #Left-sided weakness, resolved  #Left facial numbness, resolved    History of Present Illness: Aniyah Hurley is a 29 year old female with sudden onset of left-sided heaviness and sensation changes.  Patient states that occurred at a restaurant when she was in the bathroom.  No loss of taste or dysarthria.  No focal weakness per se but just feels slightly different than the right.  The symptoms are also on the left side of her face but certainly worse in the arm and the leg.  She was able to ambulate without issue.  She had  a Recent chiropractor visit with neck adjustment x2.  She also visited [place] and went on roller coaster rides last weekend with no sudden pain.  She states the right side of her neck is still slightly tender without full range of motion but has been improving.  She has a history of ocular migraines but has had no issues with this diagnosis for some time.  No vision changes or headaches.        Brief Synopsis: CT brain was unremarkable.  CTA brain and neck showed no thrombus or stenosis.  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  MRI brain was unremarkable.  Echo showed no abnormalities.  PT OT recommended no additional needs.  Neurology cleared patient for discharge.  Patient was discharged home with outpatient follow-up.    Lace+ Score: 16  59-90 High Risk  29-58 Medium Risk  0-28   Low Risk  Patient was referred to the [facility].    TCM Follow-Up Recommendation:  LACE < 29: Low Risk of readmission after discharge from the hospital. No TCM follow-up needed.      Procedures during hospitalization:    None    Incidental or significant findings and recommendations (brief descriptions):  See brief synopsis above    Lab/Test results pending at Discharge:   None    Consultants:  Neurology    Discharge Medication List:     Discharge Medications        START taking these medications        Instructions Prescription details   aspirin 81 MG Chew      Chew 1 tablet (81 mg total) by mouth daily.   Quantity: 30 tablet  Refills: 0     atorvastatin 20 MG Tabs  Commonly known as: Lipitor      Take 1 tablet (20 mg total) by mouth nightly.   Quantity: 30 tablet  Refills: 0            CONTINUE taking these medications        Instructions Prescription details   Haloette 0.12-0.015 MG/24HR Ring  Generic drug: Etonogestrel-Ethinyl Estradiol      Place 1 Ring vaginally Every 3 (three) weeks.   Refills: 0               Where to Get Your Medications        Please  your prescriptions at the location directed by your doctor or nurse    Bring a paper prescription for each of these medications  aspirin 81 MG Chew  atorvastatin 20 MG Tabs         [facility] reviewed: Yes    Follow-up appointment:   [facility]  [facility] [address]  [facility] [id number] [phone]  Follow up in 3 day(s)      Abelardo Long MD  [address]  [facility] [address]  [phone]    Schedule an appointment as soon as possible for a visit in 3 week(s)  with any neurologist    Appointments for Next 30 Days 2024 - 8/3/2024      None            Vital signs:       Physical Exam:    General: No acute distress   Lungs: clear to auscultation  Cardiovascular: S1, S2  Abdomen: Soft    -----------------------------------------------------------------------------------------------  PATIENT DISCHARGE INSTRUCTIONS: See electronic chart    Soila Lakhani DO    Total time spent on discharge plannin minutes     The  Cures Act makes medical notes like these available to patients in the interest of transparency.  Please be advised this is a medical document. Medical documents are intended to carry relevant information, facts as evident, and the clinical opinion of the practitioner. The medical note is intended as peer to peer communication and may appear blunt or direct. It is written in medical language and may contain abbreviations or verbiage that are unfamiliar.